# Patient Record
Sex: MALE | Race: WHITE | NOT HISPANIC OR LATINO | URBAN - METROPOLITAN AREA
[De-identification: names, ages, dates, MRNs, and addresses within clinical notes are randomized per-mention and may not be internally consistent; named-entity substitution may affect disease eponyms.]

---

## 2025-01-24 ENCOUNTER — OFFICE VISIT (OUTPATIENT)
Dept: URGENT CARE | Age: 80
End: 2025-01-24
Payer: MEDICARE

## 2025-01-24 VITALS
HEIGHT: 68 IN | BODY MASS INDEX: 30.31 KG/M2 | SYSTOLIC BLOOD PRESSURE: 127 MMHG | RESPIRATION RATE: 16 BRPM | WEIGHT: 200 LBS | DIASTOLIC BLOOD PRESSURE: 71 MMHG | OXYGEN SATURATION: 99 % | TEMPERATURE: 98.1 F | HEART RATE: 95 BPM

## 2025-01-24 DIAGNOSIS — J01.10 ACUTE NON-RECURRENT FRONTAL SINUSITIS: Primary | ICD-10-CM

## 2025-01-24 RX ORDER — DOXYCYCLINE 100 MG/1
100 CAPSULE ORAL 2 TIMES DAILY
Qty: 20 CAPSULE | Refills: 0 | Status: SHIPPED | OUTPATIENT
Start: 2025-01-24 | End: 2025-02-03

## 2025-01-24 RX ORDER — BENZONATATE 200 MG/1
200 CAPSULE ORAL 3 TIMES DAILY PRN
Qty: 20 CAPSULE | Refills: 0 | Status: SHIPPED | OUTPATIENT
Start: 2025-01-24 | End: 2025-01-31

## 2025-01-24 ASSESSMENT — PATIENT HEALTH QUESTIONNAIRE - PHQ9
1. LITTLE INTEREST OR PLEASURE IN DOING THINGS: NOT AT ALL
2. FEELING DOWN, DEPRESSED OR HOPELESS: NOT AT ALL
SUM OF ALL RESPONSES TO PHQ9 QUESTIONS 1 AND 2: 0

## 2025-01-24 ASSESSMENT — PAIN SCALES - GENERAL: PAINLEVEL_OUTOF10: 0-NO PAIN

## 2025-01-24 NOTE — PROGRESS NOTES
"Subjective   Patient ID: Samuel Magallanes is a 79 y.o. male. They present today with a chief complaint of Cough, Nasal Congestion, Sinus Problem, and Headache (X 8-10 days ).    History of Present Illness  HPI  Pt is a 79 yr old male who presents with nasal congestion and cough for 10 days.  His son was sick 2 weeks ago and he got sick shortly after.  He has been taking otc meds with no relief.  Past Medical History  Allergies as of 01/24/2025 - Reviewed 01/24/2025   Allergen Reaction Noted    Penicillins Other 01/24/2025       (Not in a hospital admission)         No past medical history on file.    No past surgical history on file.     reports that he has never smoked. He has never used smokeless tobacco.    Review of Systems  Review of Systems  CONSTITUTIONAL: No for fever, no chills, no recent weight loss + headache  EYES: No pain, No redness, No swelling  EARS: No discharge, No pain, No hearing loss  NOSE: + congestion, No discharge, No bleeding  MOUTH: No throat pain, No hoarseness  NECK: No pain, No stiffness, No swollen glands  CARDIOVASCULAR: No chest pain, No edema, No irregular rhythm, No palpitations  RESPIRATORY: + cough, No dyspnea  GI: No abdominal pain, No constipation or diarrhea, No N/V  : No urgency, No dysuria, No increase or decrease in urine output, No hematuria  MUSCULOSKELETAL: No back pain, No joint pain, No swelling, No weakness  SKIN: No rash, No lesions, No abrasions  NEURO: No dizziness, No alteration in mentation, No headache, No loss of consciousness, No ataxia  PSYCH: No anxiety, No depression, No SI or HI                             Objective    Vitals:    01/24/25 1058   BP: 127/71   Pulse: 95   Resp: 16   Temp: 36.7 °C (98.1 °F)   SpO2: 99%   Weight: 90.7 kg (200 lb)   Height: 1.727 m (5' 8\")     No LMP for male patient.    Physical Exam  Gen.: Vitals noted no distress afebrile. Normal phonation, no stridor, no trismus.   ENT: TMs clear bilaterally, EACs unremarkable. " Mastoids nontender. Posterior oropharynx without erythema, exudate, or swelling. Uvula is in the midline and nonedematous. No Jeremias's Angina. + rhinitis and maxillary sinus pressure on palpation  Neck: Supple. No meningismus through full range of motion. No lymphadenopathy.   Cardiac: Regular rate rhythm no murmur.   Lungs: Good aeration throughout. No adventitious breath sounds.   Abdomen: Soft nontender nonsurgical throughout. Normoactive bowel sounds.   Extremities: No peripheral edema, negative Homans bilaterally no cords.   Skin: No rash.   Neuro: No focal neurologic deficits. NIH score is 0.  Procedures    Point of Care Test & Imaging Results from this visit  No results found for this or any previous visit.  No results found.    Diagnostic study results (if any) were reviewed by JOSE ANGEL Spear.    Assessment/Plan   Allergies, medications, history, and pertinent labs/EKGs/Imaging reviewed by JOSE ANGEL Spear.     Medical Decision Making  History and physical is consistent with sinus infection.  Covid negative.  No signs of OM, OE, Strep.  Pt was prescribed abx and will  use flonase along with antihistamine and tylenol or motrin.  F/U with pcp    Orders and Diagnoses  Diagnoses and all orders for this visit:  Acute non-recurrent frontal sinusitis  -     doxycycline (Vibramycin) 100 mg capsule; Take 1 capsule (100 mg) by mouth 2 times a day for 10 days. Take with at least 8 ounces (large glass) of water, do not lie down for 30 minutes after  -     benzonatate (Tessalon) 200 mg capsule; Take 1 capsule (200 mg) by mouth 3 times a day as needed for cough for up to 7 days. Do not crush or chew.        Medical Admin Record      Follow Up Instructions  No follow-ups on file.Pt was prescribed abx and will  use flonase along with antihistamine and tylenol or motrin.  F/U with pcp    Patient disposition:    Electronically signed by JOSE ANGEL Spear  12:57 PM

## 2025-06-09 ENCOUNTER — OFFICE VISIT (OUTPATIENT)
Dept: URGENT CARE | Age: 80
End: 2025-06-09
Payer: MEDICARE

## 2025-06-09 VITALS
BODY MASS INDEX: 30.31 KG/M2 | HEART RATE: 69 BPM | SYSTOLIC BLOOD PRESSURE: 132 MMHG | WEIGHT: 200 LBS | TEMPERATURE: 98.4 F | HEIGHT: 68 IN | RESPIRATION RATE: 18 BRPM | DIASTOLIC BLOOD PRESSURE: 62 MMHG | OXYGEN SATURATION: 95 %

## 2025-06-09 DIAGNOSIS — J02.9 SORE THROAT: ICD-10-CM

## 2025-06-09 DIAGNOSIS — R52 BODY ACHES: ICD-10-CM

## 2025-06-09 DIAGNOSIS — U07.1 COVID-19: Primary | ICD-10-CM

## 2025-06-09 LAB
POC HUMAN RHINOVIRUS PCR: NEGATIVE
POC INFLUENZA A VIRUS PCR: NEGATIVE
POC INFLUENZA B VIRUS PCR: NEGATIVE
POC RESPIRATORY SYNCYTIAL VIRUS PCR: NEGATIVE
POC SARS-COV-2 AG BINAX: ABNORMAL
POC STREPTOCOCCUS PYOGENES (GROUP A STREP) PCR: NEGATIVE

## 2025-06-09 RX ORDER — NIRMATRELVIR AND RITONAVIR 300-100 MG
3 KIT ORAL 2 TIMES DAILY
Qty: 30 TABLET | Refills: 0 | Status: SHIPPED | OUTPATIENT
Start: 2025-06-09 | End: 2025-06-14

## 2025-06-09 RX ORDER — PANTOPRAZOLE SODIUM 40 MG/1
TABLET, DELAYED RELEASE ORAL
COMMUNITY

## 2025-06-09 RX ORDER — LISINOPRIL AND HYDROCHLOROTHIAZIDE 12.5; 2 MG/1; MG/1
TABLET ORAL
COMMUNITY

## 2025-06-09 RX ORDER — BENZONATATE 200 MG/1
200 CAPSULE ORAL 3 TIMES DAILY PRN
Qty: 21 CAPSULE | Refills: 0 | Status: SHIPPED | OUTPATIENT
Start: 2025-06-09 | End: 2025-06-16

## 2025-06-09 RX ORDER — ATORVASTATIN CALCIUM 80 MG/1
80 TABLET, FILM COATED ORAL DAILY
COMMUNITY

## 2025-06-09 RX ORDER — GABAPENTIN 400 MG/1
1 CAPSULE ORAL
COMMUNITY
Start: 2025-04-11

## 2025-06-09 RX ORDER — CARVEDILOL 6.25 MG/1
TABLET ORAL
COMMUNITY

## 2025-06-09 RX ORDER — ASPIRIN 81 MG/1
TABLET ORAL
COMMUNITY

## 2025-06-09 ASSESSMENT — PATIENT HEALTH QUESTIONNAIRE - PHQ9
2. FEELING DOWN, DEPRESSED OR HOPELESS: NOT AT ALL
SUM OF ALL RESPONSES TO PHQ9 QUESTIONS 1 AND 2: 0
1. LITTLE INTEREST OR PLEASURE IN DOING THINGS: NOT AT ALL

## 2025-06-09 NOTE — PROGRESS NOTES
"Subjective   Patient ID: Samuel Magallanes is a 80 y.o. male. They present today with a chief complaint of Sore Throat, Cough, Headache, and Generalized Body Aches (Since yesterday).    History of Present Illness    80-year-old patient presents to clinic with complaints of sore throat with associated headaches, cough, body aches, nausea, feeling off balance, sinus pressure ongoing for 1 day.  Reports no treatments tried. Denies shortness of breath, chest pain, fevers, chills, body aches, nausea, vomiting, abdominal pain, diarrhea.       Past Medical History  Allergies as of 06/09/2025 - Reviewed 06/09/2025   Allergen Reaction Noted    Penicillins Other 01/24/2025       Prescriptions Prior to Admission[1]     Medical History[2]    Surgical History[3]     reports that he has never smoked. He has never used smokeless tobacco.    Review of Systems    ROS negative with the exception as noted on HPI                               Objective    Vitals:    06/09/25 1615   BP: 132/62   BP Location: Left arm   Patient Position: Sitting   Pulse: 69   Resp: 18   Temp: 36.9 °C (98.4 °F)   SpO2: 95%   Weight: 90.7 kg (200 lb)   Height: 1.727 m (5' 8\")     No LMP for male patient.    Physical Exam  Constitutional:       Appearance: Normal appearance.   HENT:      Head: Normocephalic and atraumatic.      Right Ear: Tympanic membrane, ear canal and external ear normal.      Left Ear: Tympanic membrane, ear canal and external ear normal.      Nose: Mucosal edema (and erythema) present. No rhinorrhea.      Right Sinus: No maxillary sinus tenderness or frontal sinus tenderness.      Left Sinus: No maxillary sinus tenderness or frontal sinus tenderness.      Mouth/Throat:      Lips: Pink.      Mouth: Mucous membranes are moist. No oral lesions.      Dentition: Normal dentition. No gingival swelling.      Tongue: No lesions. Tongue does not deviate from midline.      Palate: No mass and lesions.      Pharynx: Posterior oropharyngeal " erythema and postnasal drip present. No pharyngeal swelling or uvula swelling.   Cardiovascular:      Rate and Rhythm: Normal rate and regular rhythm.      Heart sounds: No murmur heard.  Pulmonary:      Effort: Pulmonary effort is normal. No respiratory distress.      Breath sounds: Normal breath sounds. No stridor. No wheezing, rhonchi or rales.   Lymphadenopathy:      Cervical: Cervical adenopathy present.   Neurological:      Mental Status: He is alert.         Procedures    Point of Care Test & Imaging Results from this visit  Results for orders placed or performed in visit on 06/09/25   POCT SPOTFIRE R/ST Panel Mini w/Strep A ([employer]) manually resulted   Result Value Ref Range    POC Group A Strep, PCR Negative Negative    POC Respiratory Syncytial Virus PCR Negative Negative    POC Influenza A Virus PCR Negative Negative    POC Influenza B Virus PCR Negative Negative    POC Human Rhinovirus PCR Negative Negative   POCT Covid-19 Rapid Antigen   Result Value Ref Range    POC MARIBEL-COV-2 AG Positive test for SARS-CoV-2 (antigen detected) (A) Presumptive negative test for SARS-CoV-2 (no antigen detected)      Imaging  No results found.    Cardiology, Vascular, and Other Imaging  No other imaging results found for the past 2 days      Diagnostic study results (if any) were reviewed by Zayra Ortiz PA-C.    Assessment/Plan   Allergies, medications, history, and pertinent labs/EKGs/Imaging reviewed by Zayra Ortiz PA-C.    sore throat with associated headaches, cough, body aches, nausea, feeling off balance, sinus pressure ongoing for 1 day.  Covid-19 is positive. Paxlovid started. Pt. Was advised to stop cholesterol medication while taking paxlovid. Warm liquids with honey, increase fluid intake; encourage electrolytes such as Pedialyte,10 deep breaths an hour  May use tylenol/NSAIDS as needed for fevers, chills, body aches. Continue to walk around every couple of hours to prevent clots. Advised  to quarantine for the next 3-5 days and may return to work given pt. Has been fever for at least 24 hours. Pt is advised to proceed to the ED if there is worsening symptoms such as shortness of breath, chest pain, syncope, pre-syncope. If symptoms do not improve or worsen over the next 7 days follow up with PCP. Risk, benefits, and potential side effects of medication(s) discussed with pt. Discussed disease/illness presentation, treatment options, progression, complications, and outcomes with patient. Pt. Has expressed understanding and is an agreement of plan of care.   Medical Decision Making      Orders and Diagnoses  Diagnoses and all orders for this visit:  COVID-19  -     nirmatrelvir-ritonavir (Paxlovid) 300 mg (150 mg x 2)-100 mg tablet therapy pack; Take 3 tablets by mouth 2 times a day for 5 days. Follow the instructions on the package  -     benzonatate (Tessalon) 200 mg capsule; Take 1 capsule (200 mg) by mouth 3 times a day as needed for cough for up to 7 days. Do not crush or chew.  Sore throat  -     POCT SPOTFIRE R/ST [facility] w[facility]Strep [facility] manually resulted  Body aches  -     POCT Covid-19 Rapid Antigen      Medical Admin Record      Patient disposition: Home    Electronically signed by Zayra Ortiz PA-C  8:34 AM           [1] (Not in a hospital admission)   [2] No past medical history on file.  [3] No past surgical history on file.

## 2025-06-09 NOTE — PATIENT INSTRUCTIONS
Warm liquids with honey  Increase fluid intake; encourage electrolytes such as Pedialyte  10 deep breaths an hour  May use tylenol/NSAIDs as needed for fevers, chills, body aches.   Quarantine for 4 more days